# Patient Record
(demographics unavailable — no encounter records)

---

## 2024-11-19 NOTE — DISCUSSION/SUMMARY
[FreeTextEntry1] : Patient is a 18 y F who presents to the [facility] for a patch refill. Patient reports her last patch application was 11/11/24 and states she does not have any ACHES symptoms or break through bleeding since being seen at the [facility].  States she has had white discharge that started 1.5 weeks ago. No vaginal itching, bloody discharge, abdominal pain, dysuria, or other /GI symptoms.  She has no contraindications to estrogen as reviewed with patient by the [place] Medical Eligibility for Contraceptive Use. Urine HCG negative today.  Plan:  Patch refill  Plan: -Reviewed signed consent reviewed in chart with patient. -Dispensed a 3-month supply of Xulane patches. -Counseled regarding rare risk of VTE, ACHES symptoms reviewed. Counseled regarding common potential side effects and reviewed protocol for patches applied late and for detached patches. -Encouraged consistent condom use for STI prevention. Condoms offered and declines at this time as patient report she has a supply. -Discussed emergency contraception and indications for use. -Follow up in 3-4 weeks for contraceptive surveillance and repeat pregnancy test. -Appointment made for 1/13/24 for patch refill or RTC sooner as needed. -Encouraged to verbalize any questions or concerns, all questions answered at this time.  STI Screening and Vaginal Discharge -GC/CT (oral/urine), HIV screening, and BV panel sent  Under immunized -Per CIR patient is due for multiple vaccines. Patient reports she has a CPE exam scheduled with her PMD on 12/10/24 and will bring in a copy of her vaccination records.

## 2024-11-19 NOTE — HISTORY OF PRESENT ILLNESS
[FreeTextEntry6] : Patient is a 18 y F who presents to the [facility] for a patch refill. Patient reports her last patch application was 11/11/24 and states she does not have any ACHES symptoms or break through bleeding since being seen at the [facility].  Last vaginal SA 1-2 weeks ago Oral SA 3-4 weeks ago (gives and receives)  Denies any anal SA ever.  Condoms most of the time.   Last STI testing June 2024 was negative, patient reports 1 new partner since testing.  Coitarche 15 yo 4 Lifetime partners, all male  States she has had white discharge that started 1.5 weeks ago. No vaginal itching, bloody discharge, abdominal pain, dysuria, or other /GI symptoms.  LMP 10/25/24